# Patient Record
Sex: MALE | Race: WHITE | NOT HISPANIC OR LATINO | URBAN - METROPOLITAN AREA
[De-identification: names, ages, dates, MRNs, and addresses within clinical notes are randomized per-mention and may not be internally consistent; named-entity substitution may affect disease eponyms.]

---

## 2018-04-25 ENCOUNTER — OFFICE VISIT CONVERTED (OUTPATIENT)
Dept: CARDIOLOGY | Facility: CLINIC | Age: 78
End: 2018-04-25
Attending: INTERNAL MEDICINE

## 2018-10-15 ENCOUNTER — OFFICE VISIT CONVERTED (OUTPATIENT)
Dept: CARDIOLOGY | Facility: CLINIC | Age: 78
End: 2018-10-15
Attending: INTERNAL MEDICINE

## 2018-11-05 ENCOUNTER — CONVERSION ENCOUNTER (OUTPATIENT)
Dept: CARDIOLOGY | Facility: CLINIC | Age: 78
End: 2018-11-05
Attending: INTERNAL MEDICINE

## 2019-01-28 ENCOUNTER — HOSPITAL ENCOUNTER (OUTPATIENT)
Dept: OTHER | Facility: HOSPITAL | Age: 79
Discharge: HOME | End: 2019-01-28
Attending: INTERNAL MEDICINE

## 2019-01-28 LAB
ALBUMIN SERPL-MCNC: 4.3 G/DL (ref 3.5–5)
ALBUMIN/GLOB SERPL: 1.3 {RATIO} (ref 1.4–2.6)
ALP SERPL-CCNC: 89 U/L (ref 56–155)
ALT SERPL-CCNC: 12 U/L (ref 10–40)
ANION GAP SERPL CALC-SCNC: 19 MMOL/L (ref 8–19)
AST SERPL-CCNC: 22 U/L (ref 15–50)
BILIRUB SERPL-MCNC: 0.58 MG/DL (ref 0.2–1.3)
BUN SERPL-MCNC: 26 MG/DL (ref 5–25)
BUN/CREAT SERPL: 16 {RATIO} (ref 6–20)
CALCIUM SERPL-MCNC: 10.1 MG/DL (ref 8.7–10.4)
CHLORIDE SERPL-SCNC: 105 MMOL/L (ref 99–111)
CHOLEST SERPL-MCNC: 128 MG/DL (ref 107–200)
CHOLEST/HDLC SERPL: 2.4 {RATIO} (ref 3–6)
CONV CO2: 24 MMOL/L (ref 22–32)
CONV TOTAL PROTEIN: 7.6 G/DL (ref 6.3–8.2)
CREAT UR-MCNC: 1.66 MG/DL (ref 0.7–1.2)
GFR SERPLBLD BASED ON 1.73 SQ M-ARVRAT: 39 ML/MIN/{1.73_M2}
GLOBULIN UR ELPH-MCNC: 3.3 G/DL (ref 2–3.5)
GLUCOSE SERPL-MCNC: 91 MG/DL (ref 70–99)
HDLC SERPL-MCNC: 53 MG/DL (ref 40–60)
LDLC SERPL CALC-MCNC: 55 MG/DL (ref 70–100)
OSMOLALITY SERPL CALC.SUM OF ELEC: 300 MOSM/KG (ref 273–304)
POTASSIUM SERPL-SCNC: 5.1 MMOL/L (ref 3.5–5.3)
SODIUM SERPL-SCNC: 143 MMOL/L (ref 135–147)
TRIGL SERPL-MCNC: 102 MG/DL (ref 40–150)
VLDLC SERPL-MCNC: 20 MG/DL (ref 5–37)

## 2019-01-31 ENCOUNTER — HOSPITAL ENCOUNTER (OUTPATIENT)
Dept: SURGERY | Facility: HOSPITAL | Age: 79
Setting detail: HOSPITAL OUTPATIENT SURGERY
Discharge: HOME | End: 2019-01-31
Attending: OPHTHALMOLOGY

## 2019-02-07 ENCOUNTER — HOSPITAL ENCOUNTER (OUTPATIENT)
Dept: SURGERY | Facility: HOSPITAL | Age: 79
Setting detail: HOSPITAL OUTPATIENT SURGERY
Discharge: HOME | End: 2019-02-07
Attending: OPHTHALMOLOGY

## 2019-07-22 ENCOUNTER — OFFICE VISIT CONVERTED (OUTPATIENT)
Dept: CARDIOLOGY | Facility: CLINIC | Age: 79
End: 2019-07-22
Attending: INTERNAL MEDICINE

## 2019-07-22 ENCOUNTER — CONVERSION ENCOUNTER (OUTPATIENT)
Dept: CARDIOLOGY | Facility: CLINIC | Age: 79
End: 2019-07-22

## 2019-07-31 ENCOUNTER — HOSPITAL ENCOUNTER (OUTPATIENT)
Dept: OTHER | Facility: HOSPITAL | Age: 79
Discharge: HOME | End: 2019-07-31
Attending: NURSE PRACTITIONER

## 2019-07-31 LAB
ALBUMIN SERPL-MCNC: 4.4 G/DL (ref 3.5–5)
ALBUMIN/GLOB SERPL: 1.5 {RATIO} (ref 1.4–2.6)
ALP SERPL-CCNC: 101 U/L (ref 56–155)
ALT SERPL-CCNC: 13 U/L (ref 10–40)
ANION GAP SERPL CALC-SCNC: 16 MMOL/L (ref 8–19)
AST SERPL-CCNC: 22 U/L (ref 15–50)
BILIRUB SERPL-MCNC: 0.47 MG/DL (ref 0.2–1.3)
BNP SERPL-MCNC: 1114 PG/ML (ref 0–1800)
BUN SERPL-MCNC: 24 MG/DL (ref 5–25)
BUN/CREAT SERPL: 16 {RATIO} (ref 6–20)
CALCIUM SERPL-MCNC: 9.7 MG/DL (ref 8.7–10.4)
CHLORIDE SERPL-SCNC: 105 MMOL/L (ref 99–111)
CHOLEST SERPL-MCNC: 130 MG/DL (ref 107–200)
CHOLEST/HDLC SERPL: 2.5 {RATIO} (ref 3–6)
CONV CO2: 26 MMOL/L (ref 22–32)
CONV TOTAL PROTEIN: 7.3 G/DL (ref 6.3–8.2)
CREAT UR-MCNC: 1.51 MG/DL (ref 0.7–1.2)
GFR SERPLBLD BASED ON 1.73 SQ M-ARVRAT: 43 ML/MIN/{1.73_M2}
GLOBULIN UR ELPH-MCNC: 2.9 G/DL (ref 2–3.5)
GLUCOSE SERPL-MCNC: 92 MG/DL (ref 70–99)
HDLC SERPL-MCNC: 51 MG/DL (ref 40–60)
LDLC SERPL CALC-MCNC: 61 MG/DL (ref 70–100)
OSMOLALITY SERPL CALC.SUM OF ELEC: 298 MOSM/KG (ref 273–304)
POTASSIUM SERPL-SCNC: 5 MMOL/L (ref 3.5–5.3)
SODIUM SERPL-SCNC: 142 MMOL/L (ref 135–147)
TRIGL SERPL-MCNC: 92 MG/DL (ref 40–150)
VLDLC SERPL-MCNC: 18 MG/DL (ref 5–37)

## 2019-08-07 ENCOUNTER — CONVERSION ENCOUNTER (OUTPATIENT)
Dept: CARDIOLOGY | Facility: CLINIC | Age: 79
End: 2019-08-07
Attending: INTERNAL MEDICINE

## 2019-12-11 ENCOUNTER — HOSPITAL ENCOUNTER (OUTPATIENT)
Dept: OTHER | Facility: HOSPITAL | Age: 79
Discharge: HOME | End: 2019-12-11
Attending: INTERNAL MEDICINE

## 2019-12-11 LAB
ALBUMIN SERPL-MCNC: 4.3 G/DL (ref 3.5–5)
ALBUMIN/GLOB SERPL: 1.4 {RATIO} (ref 1.4–2.6)
ALP SERPL-CCNC: 93 U/L (ref 56–155)
ALT SERPL-CCNC: 13 U/L (ref 10–40)
ANION GAP SERPL CALC-SCNC: 15 MMOL/L (ref 8–19)
AST SERPL-CCNC: 23 U/L (ref 15–50)
BILIRUB SERPL-MCNC: 0.45 MG/DL (ref 0.2–1.3)
BNP SERPL-MCNC: 1246 PG/ML (ref 0–1800)
BUN SERPL-MCNC: 23 MG/DL (ref 5–25)
BUN/CREAT SERPL: 13 {RATIO} (ref 6–20)
CALCIUM SERPL-MCNC: 9.6 MG/DL (ref 8.7–10.4)
CHLORIDE SERPL-SCNC: 103 MMOL/L (ref 99–111)
CHOLEST SERPL-MCNC: 117 MG/DL (ref 107–200)
CHOLEST/HDLC SERPL: 2.2 {RATIO} (ref 3–6)
CONV CO2: 26 MMOL/L (ref 22–32)
CONV TOTAL PROTEIN: 7.4 G/DL (ref 6.3–8.2)
CREAT UR-MCNC: 1.77 MG/DL (ref 0.7–1.2)
GFR SERPLBLD BASED ON 1.73 SQ M-ARVRAT: 35 ML/MIN/{1.73_M2}
GLOBULIN UR ELPH-MCNC: 3.1 G/DL (ref 2–3.5)
GLUCOSE SERPL-MCNC: 93 MG/DL (ref 70–99)
HDLC SERPL-MCNC: 54 MG/DL (ref 40–60)
LDLC SERPL CALC-MCNC: 47 MG/DL (ref 70–100)
OSMOLALITY SERPL CALC.SUM OF ELEC: 291 MOSM/KG (ref 273–304)
POTASSIUM SERPL-SCNC: 5 MMOL/L (ref 3.5–5.3)
SODIUM SERPL-SCNC: 139 MMOL/L (ref 135–147)
TRIGL SERPL-MCNC: 79 MG/DL (ref 40–150)
VLDLC SERPL-MCNC: 16 MG/DL (ref 5–37)

## 2019-12-18 ENCOUNTER — CONVERSION ENCOUNTER (OUTPATIENT)
Dept: CARDIOLOGY | Facility: CLINIC | Age: 79
End: 2019-12-18

## 2019-12-18 ENCOUNTER — OFFICE VISIT CONVERTED (OUTPATIENT)
Dept: CARDIOLOGY | Facility: CLINIC | Age: 79
End: 2019-12-18
Attending: INTERNAL MEDICINE

## 2020-03-16 ENCOUNTER — OFFICE VISIT CONVERTED (OUTPATIENT)
Dept: ORTHOPEDIC SURGERY | Facility: CLINIC | Age: 80
End: 2020-03-16
Attending: ORTHOPAEDIC SURGERY

## 2020-03-16 ENCOUNTER — CONVERSION ENCOUNTER (OUTPATIENT)
Dept: ORTHOPEDIC SURGERY | Facility: CLINIC | Age: 80
End: 2020-03-16

## 2020-06-22 ENCOUNTER — HOSPITAL ENCOUNTER (OUTPATIENT)
Dept: OTHER | Facility: HOSPITAL | Age: 80
Discharge: HOME | End: 2020-06-22
Attending: NURSE PRACTITIONER

## 2020-06-22 LAB
ALBUMIN SERPL-MCNC: 4.2 G/DL (ref 3.5–5)
ALBUMIN/GLOB SERPL: 1.4 {RATIO} (ref 1.4–2.6)
ALP SERPL-CCNC: 85 U/L (ref 56–155)
ALT SERPL-CCNC: 13 U/L (ref 10–40)
ANION GAP SERPL CALC-SCNC: 19 MMOL/L (ref 8–19)
AST SERPL-CCNC: 20 U/L (ref 15–50)
BILIRUB SERPL-MCNC: 0.44 MG/DL (ref 0.2–1.3)
BUN SERPL-MCNC: 31 MG/DL (ref 5–25)
BUN/CREAT SERPL: 15 {RATIO} (ref 6–20)
CALCIUM SERPL-MCNC: 9.6 MG/DL (ref 8.7–10.4)
CHLORIDE SERPL-SCNC: 103 MMOL/L (ref 99–111)
CHOLEST SERPL-MCNC: 106 MG/DL (ref 107–200)
CHOLEST/HDLC SERPL: 2.3 {RATIO} (ref 3–6)
CONV CO2: 23 MMOL/L (ref 22–32)
CONV TOTAL PROTEIN: 7.1 G/DL (ref 6.3–8.2)
CREAT UR-MCNC: 2.07 MG/DL (ref 0.7–1.2)
GFR SERPLBLD BASED ON 1.73 SQ M-ARVRAT: 29 ML/MIN/{1.73_M2}
GLOBULIN UR ELPH-MCNC: 2.9 G/DL (ref 2–3.5)
GLUCOSE SERPL-MCNC: 92 MG/DL (ref 70–99)
HDLC SERPL-MCNC: 47 MG/DL (ref 40–60)
LDLC SERPL CALC-MCNC: 43 MG/DL (ref 70–100)
OSMOLALITY SERPL CALC.SUM OF ELEC: 294 MOSM/KG (ref 273–304)
POTASSIUM SERPL-SCNC: 5.7 MMOL/L (ref 3.5–5.3)
SODIUM SERPL-SCNC: 139 MMOL/L (ref 135–147)
TRIGL SERPL-MCNC: 78 MG/DL (ref 40–150)
VLDLC SERPL-MCNC: 16 MG/DL (ref 5–37)

## 2020-06-29 ENCOUNTER — OFFICE VISIT CONVERTED (OUTPATIENT)
Dept: CARDIOLOGY | Facility: CLINIC | Age: 80
End: 2020-06-29
Attending: INTERNAL MEDICINE

## 2020-06-29 ENCOUNTER — CONVERSION ENCOUNTER (OUTPATIENT)
Dept: CARDIOLOGY | Facility: CLINIC | Age: 80
End: 2020-06-29

## 2020-07-03 ENCOUNTER — HOSPITAL ENCOUNTER (OUTPATIENT)
Dept: OTHER | Facility: HOSPITAL | Age: 80
Discharge: HOME | End: 2020-07-03
Attending: INTERNAL MEDICINE

## 2020-07-03 LAB
ANION GAP SERPL CALC-SCNC: 16 MMOL/L (ref 8–19)
BUN SERPL-MCNC: 31 MG/DL (ref 5–25)
BUN/CREAT SERPL: 16 {RATIO} (ref 6–20)
CALCIUM SERPL-MCNC: 9.9 MG/DL (ref 8.7–10.4)
CHLORIDE SERPL-SCNC: 106 MMOL/L (ref 99–111)
CONV CO2: 23 MMOL/L (ref 22–32)
CREAT UR-MCNC: 1.94 MG/DL (ref 0.7–1.2)
GFR SERPLBLD BASED ON 1.73 SQ M-ARVRAT: 32 ML/MIN/{1.73_M2}
GLUCOSE SERPL-MCNC: 85 MG/DL (ref 70–99)
OSMOLALITY SERPL CALC.SUM OF ELEC: 296 MOSM/KG (ref 273–304)
POTASSIUM SERPL-SCNC: 4.7 MMOL/L (ref 3.5–5.3)
SODIUM SERPL-SCNC: 140 MMOL/L (ref 135–147)
T4 FREE SERPL-MCNC: 1.2 NG/DL (ref 0.9–1.8)
TSH SERPL-ACNC: 2.5 M[IU]/L (ref 0.27–4.2)

## 2020-08-20 ENCOUNTER — HOSPITAL ENCOUNTER (OUTPATIENT)
Dept: OTHER | Facility: HOSPITAL | Age: 80
Discharge: HOME | End: 2020-08-20
Attending: INTERNAL MEDICINE

## 2020-08-20 LAB
ANION GAP SERPL CALC-SCNC: 17 MMOL/L (ref 8–19)
BUN SERPL-MCNC: 31 MG/DL (ref 5–25)
BUN/CREAT SERPL: 17 {RATIO} (ref 6–20)
CALCIUM SERPL-MCNC: 10.3 MG/DL (ref 8.7–10.4)
CHLORIDE SERPL-SCNC: 100 MMOL/L (ref 99–111)
CONV CO2: 25 MMOL/L (ref 22–32)
CREAT UR-MCNC: 1.81 MG/DL (ref 0.7–1.2)
GFR SERPLBLD BASED ON 1.73 SQ M-ARVRAT: 34 ML/MIN/{1.73_M2}
GLUCOSE SERPL-MCNC: 85 MG/DL (ref 70–99)
OSMOLALITY SERPL CALC.SUM OF ELEC: 290 MOSM/KG (ref 273–304)
POTASSIUM SERPL-SCNC: 5 MMOL/L (ref 3.5–5.3)
SODIUM SERPL-SCNC: 137 MMOL/L (ref 135–147)

## 2020-10-23 ENCOUNTER — HOSPITAL ENCOUNTER (OUTPATIENT)
Dept: OTHER | Facility: HOSPITAL | Age: 80
Discharge: HOME | End: 2020-10-23
Attending: INTERNAL MEDICINE

## 2020-10-23 LAB
ALBUMIN SERPL-MCNC: 4.3 G/DL (ref 3.5–5)
ALBUMIN/GLOB SERPL: 1.3 {RATIO} (ref 1.4–2.6)
ALP SERPL-CCNC: 93 U/L (ref 56–155)
ALT SERPL-CCNC: 10 U/L (ref 10–40)
ANION GAP SERPL CALC-SCNC: 20 MMOL/L (ref 8–19)
AST SERPL-CCNC: 19 U/L (ref 15–50)
BILIRUB SERPL-MCNC: 0.62 MG/DL (ref 0.2–1.3)
BUN SERPL-MCNC: 27 MG/DL (ref 5–25)
BUN/CREAT SERPL: 14 {RATIO} (ref 6–20)
CALCIUM SERPL-MCNC: 10.2 MG/DL (ref 8.7–10.4)
CHLORIDE SERPL-SCNC: 101 MMOL/L (ref 99–111)
CONV CO2: 23 MMOL/L (ref 22–32)
CONV TOTAL PROTEIN: 7.5 G/DL (ref 6.3–8.2)
CREAT UR-MCNC: 1.87 MG/DL (ref 0.7–1.2)
GFR SERPLBLD BASED ON 1.73 SQ M-ARVRAT: 33 ML/MIN/{1.73_M2}
GLOBULIN UR ELPH-MCNC: 3.2 G/DL (ref 2–3.5)
GLUCOSE SERPL-MCNC: 92 MG/DL (ref 70–99)
OSMOLALITY SERPL CALC.SUM OF ELEC: 293 MOSM/KG (ref 273–304)
POTASSIUM SERPL-SCNC: 4.7 MMOL/L (ref 3.5–5.3)
SODIUM SERPL-SCNC: 139 MMOL/L (ref 135–147)

## 2020-11-02 ENCOUNTER — OFFICE VISIT CONVERTED (OUTPATIENT)
Dept: CARDIOLOGY | Facility: CLINIC | Age: 80
End: 2020-11-02
Attending: INTERNAL MEDICINE

## 2020-11-02 ENCOUNTER — CONVERSION ENCOUNTER (OUTPATIENT)
Dept: CARDIOLOGY | Facility: CLINIC | Age: 80
End: 2020-11-02

## 2020-11-02 ENCOUNTER — CONVERSION ENCOUNTER (OUTPATIENT)
Dept: CARDIOLOGY | Facility: CLINIC | Age: 80
End: 2020-11-02
Attending: INTERNAL MEDICINE

## 2020-12-14 ENCOUNTER — CONVERSION ENCOUNTER (OUTPATIENT)
Dept: ORTHOPEDIC SURGERY | Facility: CLINIC | Age: 80
End: 2020-12-14

## 2020-12-14 ENCOUNTER — OFFICE VISIT CONVERTED (OUTPATIENT)
Dept: ORTHOPEDIC SURGERY | Facility: CLINIC | Age: 80
End: 2020-12-14
Attending: PHYSICIAN ASSISTANT

## 2021-01-04 ENCOUNTER — HOSPITAL ENCOUNTER (OUTPATIENT)
Dept: PREADMISSION TESTING | Facility: HOSPITAL | Age: 81
Discharge: HOME | End: 2021-01-04
Attending: ORTHOPAEDIC SURGERY

## 2021-05-10 NOTE — H&P
History and Physical      Patient Name: Aneudy Mahajan   Patient ID: [id number]   Sex: Male   YOB: 1940    Primary Care Provider: Greg Gabriel MD   Referring Provider: Cecilia Church MD    Visit Date: December 14, 2020    Provider: Margarette Morrow PA-C   Location: [facility]   Location Address: [address] [place] [address]  [id number]   Location Phone: [phone]          Chief Complaint  · Right knee pain      History Of Present Illness  Aneudy Mahajan is a 80 year old /White male who presents today to [facility].      Patient is following up for right knee pain, right knee osteoarthritis, right sciatic nerve pain. Patient states sciactic nerve pain started 12/11/20 after pulling a can. Patient states pain in the right buttock radiating posterior lower leg. Patient was evaluated at [facility] ED 12/13/20 x rays right knee: advanced osteoarthritis, right hip: minimal osteoarthritis. Patient is here in a wheelchair.       Past Medical History  Arthritis; Bladder Disorder; Chest pain; Depression; Heart Attack; Hypertension; Limb Swelling; Shortness of Breath         Past Surgical History  Cholecstectomy; Colonoscopy; EGD; Gallbladder; heart surgery; Hernia; Open Heart Surgery; Pacemaker; Pacemaker.         Medication List  acetaminophen 500 mg oral tablet; allopurinol 300 mg oral tablet; aspirin 81 mg oral tablet,delayed release (DR/EC); atorvastatin 40 mg oral tablet; cholecalciferol (vitamin D3) 25 mcg (1,000 unit) oral tablet; metoprolol succinate 25 mg oral tablet extended release 24 hr; omeprazole 20 mg oral capsule,delayed release(DR/EC); Percocet 5-325 mg oral tablet         Allergy List  NO KNOWN DRUG ALLERGIES         Family Medical History  Colon Neoplasm, Malignant; Heart Disease; Cancer, Unspecified; Family history of Arthritis         Social History  Alcohol (Current some day); Alcohol Use (Never); .; lives alone; Recreational Drug Use  "(Never); Tobacco (Never)         Review of Systems  · Constitutional  o Denies  o : fever, chills, weight loss  · Cardiovascular  o Denies  o : chest pain, shortness of breath  · Gastrointestinal  o Denies  o : liver disease, heartburn, nausea, blood in stools  · Genitourinary  o Denies  o : painful urination, blood in urine  · Integument  o Denies  o : rash, itching  · Neurologic  o Denies  o : headache, weakness, loss of consciousness  · Musculoskeletal  o Denies  o : painful, swollen joints  · Psychiatric  o Denies  o : drug/alcohol addiction, anxiety, depression      Vitals  Date Time BP Position Site L\R Cuff Size HR RR TEMP (F) WT  HT  BMI kg/m2 BSA m2 O2 Sat FR L/min FiO2        12/14/2020 01:26 PM      84 - R   172lbs 0oz 5'  11\" 23.99 1.98 93 %            Physical Examination  · Constitutional  o Appearance  o : well developed, well-nourished, no obvious deformities present  · Head and Face  o Head  o :   § Inspection  § : normocephalic  o Face  o :   § Inspection  § : no facial lesions  · Eyes  o Conjunctivae  o : conjunctivae normal  o Sclerae  o : sclerae white  · Ears, Nose, Mouth and Throat  o Ears  o :   § External Ears  § : appearance within normal limits  § Hearing  § : intact  o Nose  o :   § External Nose  § : appearance normal  · Neck  o Inspection/Palpation  o : normal appearance  o Range of Motion  o : full range of motion  · Respiratory  o Respiratory Effort  o : breathing unlabored  o Inspection of Chest  o : normal appearance  o Auscultation of Lungs  o : no audible wheezing or rales  · Cardiovascular  o Heart  o : regular rate  · Gastrointestinal  o Abdominal Examination  o : soft and non-tender  · Skin and Subcutaneous Tissue  o General Inspection  o : intact, no rashes  · Psychiatric  o General  o : Alert and oriented x3  o Judgement and Insight  o : judgment and insight intact  o Mood and Affect  o : mood normal, affect appropriate  · Injection Note/Aspiration Note  o Site  o : " IM  o Procedure  o : Procedure: After educating the patient, patient gave consent for the procedure. After using alcohol prep, injection was given. The patient tolerated the procedure well.   o Medication  o : 2ml's of 4 mg Dexamethasone  · Right Knee-Street  o Inspection  o : swelling, no ecchymosis, no atrophy, varus alignment  o Palpation  o : no medial joint line tenderness, no lateral joint line tenderness, no patellar tendon tenderness, no pain of MCL, no pain at LCL  o ROM  o : -10l extension, full flexion  o Strength  o : weak extension, weak flexion   o Special Tests  o : negative varus stress, negaitve valgus stress  o Neurovascular  o : Full sensation, Dorsal Pedal Pulse 2+, posteriror tibialis pulse 2+  · Right Hip-Street  o Inspection  o : No scar  o Palpation  o : no tenderness of iliac crest, no tenderness of iliac tubercle, no tenderness of greater trochanter, no tenderness at pubic tubercle, no tenderness at PSIS, no tenderness at ASIS, no tenderness at ischial tuberosity, no tenderness at sacroiliac joint, no tenderness at femoral triangle nerve, moderate tenderness at sciatic nerve, no tenderness at hip and pelvic muscles  o ROM  o : normal extension (30), normal abduction (45-50), normal adduction, normal internal rotation, normal external rotation  o Special Tests  o : no pain with flexion, no pain with extension, no pain with rotation  o Neurologic Testing  o : Neurovascular and sensation intact          Assessment  · Primary osteoarthritis of right knee     715.16/M17.11  · Right knee pain, unspecified chronicity     719.46/M25.561  · Sciatic nerve pain     724.3/M54.30      Plan  · Orders  o 2.00 - Dexamethasone Injection 8mg (-2) - 719.46/M25.561 - 12/14/2020   lot [id number] Exp 12 2021 [employer] Administered by ALISE SUERO  o IM - Injection Fee [facility] ([id number]) - 719.46/M25.561 - 12/14/2020   Administered by ALISE SUERO  · Medications  o Medications have been Reconciled  o Transition of Care  or Provider Policy  · Instructions  o Dr. Wallace saw and examined the patient and agrees with plan.   o X-rays reviewed by Dr. Wallace.  o Reviewed the patient's Past Medical, Social, and Family history as well as the ROS at today's visit, no changes.  o Call or return if worsening symptoms.  o Discussed surgery.  o Risks/benefits discussed with patient including, but not limited to: infection, bleeding, neurovascular damage, malunion, nonunion, aesthetic deformity, need for further surgery, and death.  o Discussed with patient the implant type being used during surgery and patient understands and desires to proceed.  o Surgery pamphlet given.  o Exercise handout given.  o X-ray ordered, taken and reviewed at this visit.  o Electronically Identified Patient Education Materials Provided Electronically            Electronically Signed by: Margarette Morrow PA-C -Author on December 14, 2020 02:17:36 PM  Electronically Co-signed by: Adama Wallace MD -Reviewer on December 16, 2020 05:09:37 PM

## 2021-05-10 NOTE — PROCEDURES
"   Procedure Note      Patient Name: Aneudy Mahajan   Patient ID: [id number]   Sex: Male   YOB: 1940    Primary Care Provider: Greg Gabriel MD   Referring Provider: Cecilia Church MD    Visit Date: November 2, 2020    Provider: Cecilia Church MD   Location: [facility]   Location Address: [address] [place] [address], [address]   ROS[place] Pelaez  [id number]   Location Phone: [phone]          FINAL REPORT   TRANSTHORACIC ECHOCARDIOGRAM REPORT    Diagnosis: Aortic stenosis, preoperative examination.   Height: 5'10\" Weight: 167 B/P: 102/70 BSA: 1.9   Tech: BNS   MEASUREMENTS:  RVID (Diastole) : RVID. (NORMAL: 0.7 to 2.4 cm max)   LVID (Systole): 3.5 cm (Diastole): 4.8 cm . (NORMAL: 3.7 - 5.4 cm)   Posterior Wall Thickness (Diastole): 0.9 cm. (NORMAL: 0.8 - 1.1 cm)   Septal Thickness (Diastole): 1.0 cm. (NORMAL: 0.7 - 1.2 cm)   LAID (Systole): 3.5 cm. (NORMAL: 1.9 - 3.8 cm)   Aortic Root Diameter (Diastole): 4.6 cm. (NORMAL: 2.0 - 3.7 cm)   COMMENTS:  The patient underwent 2-D, M-Mode, and Doppler examination, including pulse-wave, continuous-wave, and color-flow analysis; the study is technically adequate.   FINDINGS:  MITRAL VALVE: Fibrocalcific changes noted of the mitral valve with mitral annular calcification.   AORTIC VALVE: Heavy fibrocalcific changes noted of a trileaflet aortic valve with limited opening of the aortic valve leaflets.   TRICUSPID VALVE: Normal.   PULMONIC VALVE: Normal.   LEFT ATRIUM: Normal; no masses seen. LA volume is 19 mL/m2.   AORTIC ROOT: Mildly dilated with normal motion.   LEFT VENTRICLE: Normal left ventricular chamber size with normal left ventricular systolic function with an estimated ejection fraction of 60%. There is abnormal motion of the interventricular septum secondary to right ventricular pacing.   RIGHT ATRIUM: Normal. Pacing leads noted.   RIGHT VENTRICLE: Normal size and function.   PERICARDIUM: No effusion.   INFERIOR VENA CAVA: Diameter is 1.4 cm with " greater than 50% reduction with inspiration.   DOPPLER: Pulse-wave, continuous wave, and color-flow Doppler evaluation was performed. E/A ratio is 0.5. DT= 123 msec. IVRT is 85 msec. E/E' is 11. There is mild mitral valve regurgitation present. There is increase in velocity across the fibrotic aortic valve with a peak gradient of 30 mmHg, mean gradient of 15 mmHg, with a V-max of 2.7 m/sec, and estimated aortic valve area of 1.1 cm2 with dimensionless index of 0.49. There is tricuspid regurgitation with normal estimated pulmonary artery systolic pressure and mild aortic valve regurgitation.   Faxed: 11/03/2020      CONCLUSION:  1.  Normal left ventricular chamber size with normal left ventricular systolic function with an estimated ejection        fraction of 60%. There is abnormal motion of the interventricular septum secondary to right ventricular        pacing.   2.  Left ventricular diastolic noncompliance.   3.  Moderate to moderately severe aortic valve stenosis with mild aortic valve regurgitation.   4.  Fibrocalcific changes noted of the mitral valve with mild mitral valve regurgitation.  5.  Tricuspid regurgitation with normal estimated pulmonary artery systolic pressure.       Cecilia Church MD, [facility]  PM/pap    This note was transcribed by Basilia Bailey.  pap/pm  The above service was transcribed by Basilia Bailey, and I attest to the accuracy of the note.  PM                 Electronically Signed by: Елена Bailey-, Other -Author on November 3, 2020 09:11:19 AM  Electronically Co-signed by: Cecilia Church MD -Reviewer on November 8, 2020 08:57:43 PM

## 2021-05-13 NOTE — PROGRESS NOTES
Progress Note      Patient Name: Aneudy Mahajan   Patient ID: [id number]   Sex: Male   YOB: 1940    Primary Care Provider: Greg Gabriel MD   Referring Provider: Cecilia Church MD    Visit Date: November 2, 2020    Provider: Cecilia Church MD   Location: [facility]   Location Address: [address] [place] [address], [facility]   ROS[place] Pelaez  [id number]   Location Phone: [phone]          Chief Complaint  · Shortness of breath   · Needing clearance for knee surgery       History Of Present Illness  REFERRING PROVIDER: Greg Gabriel MD   Aneudy Mahajan is an 79-year-old white male who continues to be short of breath. It is mild with moderate exertion. He says that is unchanged. He admits he does not get any regular exercise and cannot do much walking, not because of shortness of breath but because he has problems with his right knee and needs to have knee replacement. Swelling is mild. Denies any chest pain, palpitations, dizziness, syncope, PND, or orthopnea.   PAST MEDICAL HISTORY: Aortic valve regurgitation; Coronary artery disease with previous bypass surgery (November 2005 x4, LIMA to the anterior branch of the diagonal, SVG to the LAD, SVG to the posterior descending and posterolateral branches of the circumflex); Hyperlipidemia; Hypertension.   PSYCHOSOCIAL HISTORY: Rarely has alcohol. Previously smoked but quit.   CURRENT MEDICATIONS: Omeprazole 20 mg daily; Cholecalciferol 25 mcg daily; Allopurinol 300 mg 1/2 tablet daily; aspirin 81 mg daily; Atorvastatin 40 mg daily; Metoprolol  mg 1/2 tablet daily.       Review of Systems  · Cardiovascular  o Admits  o : swelling (feet, ankles, hands), shortness of breath while walking or lying flat  o Denies  o : palpitations (fast, fluttering, or skipping beats), chest pain or angina pectoris   · Respiratory  o Admits  o : chronic or frequent cough      Vitals  Date Time BP Position Site L\R Cuff Size HR RR TEMP (F) WT  HT  BMI kg/m2 BSA m2  "O2 Sat FR L/min FiO2        11/02/2020 12:04 /70 Sitting    74 - R   166lbs 16oz 5'  10\" 23.96 1.93             Physical Examination  · Constitutional  o Appearance  o : Awake, alert, in no acute distress, very hard of hearing, ambulates slowly with a cane.  · Eyes  o Conjunctivae  o : Conjunctivae normal.  · Ears, Nose, Mouth and Throat  o Oral Cavity  o :   § Oral Mucosa  § : Normal.  · Neck  o Jugular Veins  o : No JVD. Good carotid upstroke. No bruits noted.  · Respiratory  o Respiratory  o : Kyphosis with good respiratory effort. Negative rales or rhonchi.  · Cardiovascular  o Heart  o : PMI not well felt. S1, S2 regular. No S3. Positive S4. 2/6 systolic murmur at the base and the apex. Negative diastolic murmur.  o Peripheral Vascular System  o :   § Extremities  § : Good femoral and pedal pulses. No pedal edema.  · Gastrointestinal  o Abdominal Examination  o : Soft. No tenderness or masses felt. No hepatosplenomegaly. Abdominal aorta is not palpable.  · Labs  o Labs  o : BUN 27, creatinine 1.87.     Echocardiogram was ordered and reviewed.     His pacemaker was checked today.  Middle-of-life parameters on the generator estimated with 3 years left. Atrial paced 45% of the time, V-paced 100% of the time. He is pacemaker-dependent.  No high rate episodes.           Assessment     1.  Moderately severe aortic stenosis.   2.  Coronary artery disease with previous bypass without angina.   3.  Hyperlipidemia.   4.  Hypertension, controlled.       Plan     1.  From a cardiac standpoint, he is clear for knee surgery.   2.  Followup in six months or earlier if needed.       ÁNGEL Sagastume/Cecilia Church MD, [facility]  TAWNY/OTTONIEL/jordana    This note was transcribed by Basilia Bailey. TAWNY/OTTONIEL/jordana  The above service was transcribed by Basilia Bailey on my behalf and I attest to the accuracy of the note.  TAWNY/PM             Electronically Signed by: Елена Bailey-, Other -Author on November 3, 2020 " 10:16:55 AM  Electronically Co-signed by: Cecilia Church MD -Reviewer on November 8, 2020 05:59:32 PM

## 2021-05-13 NOTE — PROGRESS NOTES
Progress Note      Patient Name: Aneudy Mahajan   Patient ID: [id number]   Sex: Male   YOB: 1940    Primary Care Provider: Greg Gabriel MD   Referring Provider: Cecilia Church MD    Visit Date: June 29, 2020    Provider: Cecilia Chucrh MD   Location: [facility]   Location Address: [address] [place] [address] A   [place]  [id number]   Location Phone: [phone]          Chief Complaint  · Follow up of hypertension  · Aortic valve disease  · Complaints of arthritis       History Of Present Illness  REFERRING PROVIDER: Cecilia Church MD   Aneudy Mahajan is an 79-year-old gentleman with aortic valve disease, coronary artery disease, previous bypass surgery, hypertension, and hyperlipidemia who is doing okay from a cardiovascular standpoint. He has mild occasional chest pain on and off once or twice once a month that has not changed in his intensity or severity over the last six months. He complains a lot about his joints hurting especially his knee joints. He is trying to get the orthopedic surgeon to work on his knee.   PAST MEDICAL HISTORY: Aortic valve regurgitation; Coronary artery disease with previous bypass surgery (November 2005 x4, LIMA to the anterior branch of the diagonal, SVG to the LAD, SVG to the posterior descending and posterolateral branches of the circumflex); Hyperlipidemia; Hypertension.   FAMILY HISTORY: Negative for diabetes, hypertension, and heart disease.   PSYCHOSOCIAL HISTORY: Positive for history of mood change or depression. He rarely drinks alcohol. He does not use tobacco.   CURRENT MEDICATIONS: include Omeprazole 20 mg daily; aspirin 81 mg daily; Allopurinol 150 mg daily; Atorvastatin 40 mg daily; Tylenol; Metoprolol  mg 1/2 tablet daily; vitamin D3 1000 units daily. The dosage and frequency of the medications were reviewed with the patient.       Review of Systems  · Cardiovascular  o Denies  o : palpitations (fast, fluttering, or  "skipping beats), swelling (feet, ankles, hands), shortness of breath while walking or lying flat, chest pain or angina pectoris   · Respiratory  o Denies  o : chronic or frequent cough, asthma or wheezing      Vitals  Date Time BP Position Site L\R Cuff Size HR RR TEMP (F) WT  HT  BMI kg/m2 BSA m2 O2 Sat HC       06/29/2020 01:12 /64 Sitting    74 - R   166lbs 16oz 5'  10\" 23.96 1.93           Physical Examination  · Constitutional  o Appearance  o : Awake, alert, in no acute distress.  · Eyes  o Conjunctivae  o : Conjunctivae normal.  · Ears, Nose, Mouth and Throat  o Oral Cavity  o :   § Oral Mucosa  § : Normal.  · Neck  o Inspection/Palpation/Auscultation  o : No lymphadenopathy. No JVD. No bruit. Good carotid upstroke.  · Respiratory  o Respiratory  o : Kyphosis with good respiratory effort. Negative rales or rhonchi.   · Cardiovascular  o Heart  o : PMI not well felt. S1, S2 regular. No S3. Positive S4. 2/6 systolic murmur at the base and the apex. Negative diastolic murmur.   o Peripheral Vascular System  o :   § Extremities  § : Trace pedal edema. Good femoral and pedal pulses.  · Gastrointestinal  o Abdominal Examination  o : Soft. No masses or tenderness felt. No hepatosplenomegaly. Abdominal aorta is not palpable.  · Labs  o Labs  o : BUN 31, creatinine 2.1, potassium 5.7 which is elevated. HDL 47, LDL 40, triglyceride 78.      Pacemaker check performed on 06/24/2020 revealed normally functioning pacemaker with 3.5 years of estimated longevity on the generator.  The RA amplitude was increased to 2.8 for safety margin.  There were no episodes recorded.           Assessment     1.  Mild hyperkalemia.   2.  Coronary artery disease, stable angina pectoris.   3.  Normally functioning pacemaker.   4.  Aortic stenosis and mitral valve stenosis, clinically unchanged.   5.  Hyperlipidemia, at goal.       Plan     1.  I once again reiterated to him the need for a low potassium diet.    2.  Follow up with us in " six months.  The patient will get a BMP and thyroid panel this Friday, a BMP in two        months, and a CMP and lipids before his next appointment.       Cecilia Church MD, [facility]  PM/pap    This note was transcribed by Basilia Bailey.  pap/pm  The above service was transcribed by Basilia Bailey, and I attest to the accuracy of the note.  PM             Electronically Signed by: Елена Bailey-, Other -Author on July 1, 2020 02:53:05 PM  Electronically Co-signed by: Cecilia Church MD -Reviewer on July 3, 2020 08:18:37 PM

## 2021-05-14 VITALS
SYSTOLIC BLOOD PRESSURE: 102 MMHG | WEIGHT: 167 LBS | HEIGHT: 70 IN | HEART RATE: 74 BPM | DIASTOLIC BLOOD PRESSURE: 70 MMHG | BODY MASS INDEX: 23.91 KG/M2

## 2021-05-14 VITALS — HEIGHT: 71 IN | BODY MASS INDEX: 24.08 KG/M2 | HEART RATE: 84 BPM | WEIGHT: 172 LBS | OXYGEN SATURATION: 93 %

## 2021-05-15 VITALS
BODY MASS INDEX: 23.91 KG/M2 | HEIGHT: 70 IN | SYSTOLIC BLOOD PRESSURE: 110 MMHG | WEIGHT: 167 LBS | DIASTOLIC BLOOD PRESSURE: 64 MMHG | HEART RATE: 74 BPM

## 2021-05-15 VITALS
BODY MASS INDEX: 23.38 KG/M2 | SYSTOLIC BLOOD PRESSURE: 134 MMHG | HEART RATE: 68 BPM | HEIGHT: 71 IN | WEIGHT: 167 LBS | DIASTOLIC BLOOD PRESSURE: 82 MMHG

## 2021-05-15 VITALS
HEIGHT: 71 IN | BODY MASS INDEX: 23.52 KG/M2 | SYSTOLIC BLOOD PRESSURE: 118 MMHG | WEIGHT: 168 LBS | HEART RATE: 72 BPM | DIASTOLIC BLOOD PRESSURE: 68 MMHG

## 2021-05-15 VITALS — BODY MASS INDEX: 24.08 KG/M2 | HEART RATE: 83 BPM | OXYGEN SATURATION: 96 % | WEIGHT: 172 LBS | HEIGHT: 71 IN

## 2021-05-16 VITALS
SYSTOLIC BLOOD PRESSURE: 112 MMHG | WEIGHT: 168 LBS | BODY MASS INDEX: 24.05 KG/M2 | HEART RATE: 72 BPM | DIASTOLIC BLOOD PRESSURE: 70 MMHG | HEIGHT: 70 IN

## 2021-05-16 VITALS
HEART RATE: 78 BPM | SYSTOLIC BLOOD PRESSURE: 108 MMHG | DIASTOLIC BLOOD PRESSURE: 68 MMHG | WEIGHT: 167 LBS | HEIGHT: 70 IN | BODY MASS INDEX: 23.91 KG/M2